# Patient Record
Sex: FEMALE | Race: WHITE | Employment: FULL TIME | URBAN - METROPOLITAN AREA
[De-identification: names, ages, dates, MRNs, and addresses within clinical notes are randomized per-mention and may not be internally consistent; named-entity substitution may affect disease eponyms.]

---

## 2023-02-13 PROBLEM — R00.2 PALPITATIONS: Status: ACTIVE | Noted: 2023-02-13

## 2023-02-13 PROBLEM — R00.0 TACHYCARDIA: Status: ACTIVE | Noted: 2023-02-13

## 2023-02-13 RX ORDER — NORETHINDRONE ACETATE AND ETHINYL ESTRADIOL 1MG-20(21)
1 KIT ORAL DAILY
COMMUNITY
Start: 2021-03-01

## 2023-02-13 RX ORDER — FLUTICASONE PROPIONATE 50 MCG
1 SPRAY, SUSPENSION (ML) NASAL DAILY
COMMUNITY
Start: 2018-11-16 | End: 2023-05-25 | Stop reason: SDUPTHER

## 2023-03-24 ENCOUNTER — APPOINTMENT (OUTPATIENT)
Dept: PRIMARY CARE | Facility: CLINIC | Age: 23
End: 2023-03-24
Payer: COMMERCIAL

## 2023-05-25 ENCOUNTER — OFFICE VISIT (OUTPATIENT)
Dept: PRIMARY CARE | Facility: CLINIC | Age: 23
End: 2023-05-25
Payer: COMMERCIAL

## 2023-05-25 VITALS
BODY MASS INDEX: 19.87 KG/M2 | OXYGEN SATURATION: 97 % | DIASTOLIC BLOOD PRESSURE: 65 MMHG | SYSTOLIC BLOOD PRESSURE: 125 MMHG | WEIGHT: 116.4 LBS | TEMPERATURE: 97.5 F | HEIGHT: 64 IN | HEART RATE: 100 BPM

## 2023-05-25 DIAGNOSIS — Z00.00 ROUTINE HEALTH MAINTENANCE: ICD-10-CM

## 2023-05-25 DIAGNOSIS — Z13.89 SCREENING FOR MULTIPLE CONDITIONS: ICD-10-CM

## 2023-05-25 DIAGNOSIS — R00.0 TACHYCARDIA: Primary | ICD-10-CM

## 2023-05-25 PROCEDURE — G0444 DEPRESSION SCREEN ANNUAL: HCPCS | Performed by: STUDENT IN AN ORGANIZED HEALTH CARE EDUCATION/TRAINING PROGRAM

## 2023-05-25 PROCEDURE — 1036F TOBACCO NON-USER: CPT | Performed by: STUDENT IN AN ORGANIZED HEALTH CARE EDUCATION/TRAINING PROGRAM

## 2023-05-25 PROCEDURE — 99395 PREV VISIT EST AGE 18-39: CPT | Performed by: STUDENT IN AN ORGANIZED HEALTH CARE EDUCATION/TRAINING PROGRAM

## 2023-05-25 PROCEDURE — G0442 ANNUAL ALCOHOL SCREEN 15 MIN: HCPCS | Performed by: STUDENT IN AN ORGANIZED HEALTH CARE EDUCATION/TRAINING PROGRAM

## 2023-05-25 PROCEDURE — 93000 ELECTROCARDIOGRAM COMPLETE: CPT | Performed by: STUDENT IN AN ORGANIZED HEALTH CARE EDUCATION/TRAINING PROGRAM

## 2023-05-25 ASSESSMENT — ENCOUNTER SYMPTOMS
CHILLS: 0
FEVER: 0
NAUSEA: 0
NERVOUS/ANXIOUS: 0
DYSURIA: 0
DIZZINESS: 0
VOMITING: 0
PALPITATIONS: 0
SHORTNESS OF BREATH: 0
DIAPHORESIS: 0
LIGHT-HEADEDNESS: 0

## 2023-05-25 ASSESSMENT — PATIENT HEALTH QUESTIONNAIRE - PHQ9
SUM OF ALL RESPONSES TO PHQ9 QUESTIONS 1 AND 2: 0
2. FEELING DOWN, DEPRESSED OR HOPELESS: NOT AT ALL
1. LITTLE INTEREST OR PLEASURE IN DOING THINGS: NOT AT ALL

## 2023-05-25 NOTE — ASSESSMENT & PLAN NOTE
On OCP- sees gyn     Allergy to bananas     ER Nurse  UTD HPV and tdap    CPE completed.  Advised to keep a heart healthy, low fat  diet with fruits and veggies like Mediterranean diet.  Advised on the importance of exercise and maintaining 150 minutes of exercise per week (30 minutes per day 5 days a week).  Advised on regular eye and dental visits.  Discussed age appropriate cancer screening, immunizations and recommendations given.  Discussed avoiding illicit drugs and tobacco. Advised on appropriate use of alcohol.  Advised to wear seat belt.  F/u 1 year Dr. Montiel or malik BAHENAN  Advised covid vaccination and flu vaccination after labor day.  Ordered routine labs

## 2023-05-25 NOTE — ASSESSMENT & PLAN NOTE
Initial  on arrival, improved to 100. EKG due to irregular rhythm on PE and tachycardia, showed sinus arrythmia, present on prior EKGs.  S/p echo 2 years ago and saw cardiology initially. Advised F/u with cardiology for this as never saw for follow up.

## 2023-05-25 NOTE — PROGRESS NOTES
"Subjective   Patient ID: Eladia Victoria is a 22 y.o. female who presents for Annual Exam.  She is here for CPE. No acute concerns today. Worked until 3:30AM last night and rushed to come to appt today. HR at home usually in 50-80s.     PAP UTD, seeing gyn next month.     Social History: Lives at home with parents. Currently ER nurse. In a relationship and feels safe in her relationship.        Review of Systems   Constitutional:  Negative for chills, diaphoresis and fever.   HENT:  Negative for hearing loss.    Eyes:  Negative for visual disturbance.   Respiratory:  Negative for shortness of breath.    Cardiovascular:  Negative for chest pain, palpitations and leg swelling.   Gastrointestinal:  Negative for nausea and vomiting.   Endocrine: Negative for cold intolerance and heat intolerance.   Genitourinary:  Negative for dysuria.   Skin:  Negative for rash.   Neurological:  Negative for dizziness and light-headedness.   Psychiatric/Behavioral:  The patient is not nervous/anxious.        /65   Pulse 100   Temp 36.4 °C (97.5 °F)   Ht 1.619 m (5' 3.75\")   Wt 52.8 kg (116 lb 6.4 oz)   SpO2 97%   BMI 20.14 kg/m²     Objective   Physical Exam  Vitals reviewed.   HENT:      Head: Normocephalic.      Right Ear: Tympanic membrane, ear canal and external ear normal. There is no impacted cerumen.      Left Ear: Tympanic membrane, ear canal and external ear normal. There is no impacted cerumen.      Mouth/Throat:      Mouth: Mucous membranes are moist.      Pharynx: Oropharynx is clear. No oropharyngeal exudate or posterior oropharyngeal erythema.   Cardiovascular:      Rate and Rhythm: Normal rate. Rhythm irregular.   Pulmonary:      Effort: Pulmonary effort is normal. No respiratory distress.      Breath sounds: Normal breath sounds.   Abdominal:      General: Bowel sounds are normal. There is no distension.      Palpations: Abdomen is soft.      Tenderness: There is no abdominal tenderness. There is no guarding " or rebound.   Musculoskeletal:         General: No swelling or deformity.      Cervical back: Neck supple. No tenderness.   Lymphadenopathy:      Cervical: No cervical adenopathy.   Skin:     Coloration: Skin is not jaundiced.   Neurological:      Mental Status: She is alert.   Psychiatric:         Mood and Affect: Mood normal.         Behavior: Behavior normal.         Assessment/Plan   Problem List Items Addressed This Visit          Circulatory    Tachycardia - Primary     Initial  on arrival, improved to 100. EKG due to irregular rhythm on PE and tachycardia, showed sinus arrythmia, present on prior EKGs.  S/p echo 2 years ago and saw cardiology initially. Advised F/u with cardiology for this as never saw for follow up.         Relevant Orders    ECG 12 lead (Clinic Performed) (Completed)       Other    Routine health maintenance     On OCP- sees gyn     Allergy to bananas     ER Nurse  UTD HPV and tdap    CPE completed.  Advised to keep a heart healthy, low fat  diet with fruits and veggies like Mediterranean diet.  Advised on the importance of exercise and maintaining 150 minutes of exercise per week (30 minutes per day 5 days a week).  Advised on regular eye and dental visits.  Discussed age appropriate cancer screening, immunizations and recommendations given.  Discussed avoiding illicit drugs and tobacco. Advised on appropriate use of alcohol.  Advised to wear seat belt.  F/u 1 year Dr. Montiel or malik PRN  Advised covid vaccination and flu vaccination after labor day.  Ordered routine labs         Relevant Orders    CBC    Lipid Panel    Comprehensive Metabolic Panel    TSH with reflex to Free T4 if abnormal     Other Visit Diagnoses       Screening for multiple conditions [Z13.89 (ICD-10-CM)]

## 2024-05-23 ENCOUNTER — OFFICE VISIT (OUTPATIENT)
Dept: PRIMARY CARE | Facility: CLINIC | Age: 24
End: 2024-05-23
Payer: COMMERCIAL

## 2024-05-23 VITALS
DIASTOLIC BLOOD PRESSURE: 84 MMHG | TEMPERATURE: 99 F | WEIGHT: 119.2 LBS | HEIGHT: 64 IN | OXYGEN SATURATION: 98 % | BODY MASS INDEX: 20.35 KG/M2 | SYSTOLIC BLOOD PRESSURE: 117 MMHG | HEART RATE: 102 BPM | RESPIRATION RATE: 16 BRPM

## 2024-05-23 DIAGNOSIS — Z00.00 ROUTINE HEALTH MAINTENANCE: Primary | ICD-10-CM

## 2024-05-23 DIAGNOSIS — L70.9 ACNE, UNSPECIFIED ACNE TYPE: ICD-10-CM

## 2024-05-23 DIAGNOSIS — Z91.018 ALLERGY TO BANANA: ICD-10-CM

## 2024-05-23 PROCEDURE — 1036F TOBACCO NON-USER: CPT | Performed by: INTERNAL MEDICINE

## 2024-05-23 PROCEDURE — 99395 PREV VISIT EST AGE 18-39: CPT | Performed by: INTERNAL MEDICINE

## 2024-05-23 RX ORDER — CLINDAMYCIN PHOSPHATE 10 UG/ML
LOTION TOPICAL 2 TIMES DAILY
Qty: 60 ML | Refills: 0 | Status: SHIPPED | OUTPATIENT
Start: 2024-05-23 | End: 2025-05-23

## 2024-05-23 RX ORDER — EPINEPHRINE 0.3 MG/.3ML
1 INJECTION SUBCUTANEOUS ONCE AS NEEDED
Qty: 2 EACH | Refills: 0 | Status: SHIPPED | OUTPATIENT
Start: 2024-05-23

## 2024-05-23 ASSESSMENT — ENCOUNTER SYMPTOMS
BLOOD IN STOOL: 0
POLYDIPSIA: 0
ABDOMINAL PAIN: 0
CHEST TIGHTNESS: 0
DYSURIA: 0
HEMATURIA: 0
MYALGIAS: 0
WHEEZING: 0
DYSPHORIC MOOD: 0
CHILLS: 0
VOMITING: 0
PALPITATIONS: 0
NERVOUS/ANXIOUS: 0
COUGH: 0
UNEXPECTED WEIGHT CHANGE: 0
FREQUENCY: 0
WOUND: 0
NAUSEA: 0
SORE THROAT: 0
EYE PAIN: 0
DIARRHEA: 0
FEVER: 0
ARTHRALGIAS: 0
DIZZINESS: 0
CONSTIPATION: 0
HEADACHES: 0
RHINORRHEA: 0
POLYPHAGIA: 0
SHORTNESS OF BREATH: 0

## 2024-05-23 ASSESSMENT — PATIENT HEALTH QUESTIONNAIRE - PHQ9
SUM OF ALL RESPONSES TO PHQ9 QUESTIONS 1 AND 2: 0
1. LITTLE INTEREST OR PLEASURE IN DOING THINGS: NOT AT ALL
2. FEELING DOWN, DEPRESSED OR HOPELESS: NOT AT ALL

## 2024-05-23 NOTE — PROGRESS NOTES
"Subjective   Patient ID: Eladia Victoria is a 23 y.o. female who presents for Annual Exam.    HPI     HR in 60s    Dental visits- UTD  Eye visits-no issues    Exercise-Hikes, weights  Diet- healthy     Alcohol use-1-2 x per week  Smoking-none    No concerns    Acne under chin. Uses benzoyl peroxide 10% otc and otc tretinoin and helps minimally. Tretinoin was too drying    Review of Systems   Constitutional:  Negative for chills, fever and unexpected weight change.   HENT:  Negative for congestion, hearing loss, rhinorrhea and sore throat.    Eyes:  Negative for pain and visual disturbance.   Respiratory:  Negative for cough, chest tightness, shortness of breath and wheezing.    Cardiovascular:  Negative for chest pain and palpitations.   Gastrointestinal:  Negative for abdominal pain, blood in stool, constipation, diarrhea, nausea and vomiting.   Endocrine: Negative for cold intolerance, heat intolerance, polydipsia and polyphagia.   Genitourinary:  Negative for dysuria, frequency and hematuria.   Musculoskeletal:  Negative for arthralgias and myalgias.   Skin:  Negative for rash and wound.   Neurological:  Negative for dizziness, syncope and headaches.   Psychiatric/Behavioral:  Negative for dysphoric mood. The patient is not nervous/anxious.        Objective   /84 (BP Location: Left arm, Patient Position: Sitting, BP Cuff Size: Adult)   Pulse 102   Temp 37.2 °C (99 °F)   Resp 16   Ht 1.613 m (5' 3.5\")   Wt 54.1 kg (119 lb 3.2 oz)   SpO2 98%   BMI 20.78 kg/m²     Physical Exam  Vitals reviewed.   Constitutional:       Appearance: Normal appearance. She is not ill-appearing.   HENT:      Head: Normocephalic and atraumatic.      Right Ear: Tympanic membrane normal.      Left Ear: Tympanic membrane normal.      Nose: Nose normal.      Mouth/Throat:      Mouth: Mucous membranes are moist.      Pharynx: Oropharynx is clear.   Eyes:      Extraocular Movements: Extraocular movements intact.      " Conjunctiva/sclera: Conjunctivae normal.      Pupils: Pupils are equal, round, and reactive to light.   Cardiovascular:      Rate and Rhythm: Normal rate and regular rhythm.   Pulmonary:      Effort: Pulmonary effort is normal.      Breath sounds: Normal breath sounds. No wheezing.   Chest:   Breasts:     Right: Normal. No mass.      Left: Normal. No mass.   Abdominal:      General: There is no distension.      Palpations: Abdomen is soft. There is no mass.      Tenderness: There is no abdominal tenderness.   Musculoskeletal:         General: No swelling.      Cervical back: Neck supple.   Lymphadenopathy:      Cervical: No cervical adenopathy.   Neurological:      General: No focal deficit present.      Mental Status: She is alert and oriented to person, place, and time.      Gait: Gait normal.   Psychiatric:         Mood and Affect: Mood normal.         Behavior: Behavior normal.         Thought Content: Thought content normal.         Assessment/Plan   Problem List Items Addressed This Visit             ICD-10-CM    Routine health maintenance - Primary Z00.00    Relevant Orders    Lipid Panel    Glucose, fasting    Allergy to banana Z91.018    Relevant Medications    EPINEPHrine (Epipen) 0.3 mg/0.3 mL injection syringe     Other Visit Diagnoses         Codes    Acne, unspecified acne type     L70.9    Relevant Medications    clindamycin (Cleocin T) 1 % lotion          CPE completed.  Advised to keep a heart healthy, low fat  diet with fruits and veggies like Mediterranean diet.  Advised on the importance of exercise and maintaining 150 minutes of exercise per week (30 minutes per day 5 days a week).  Advised on regular eye and dental visits.  Discussed age appropriate cancer screening, immunizations and recommendations given.  Discussed avoiding illicit drugs and tobacco. Advised on appropriate use of alcohol.  Advised to wear seat belt.    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

## 2024-07-03 ENCOUNTER — LAB (OUTPATIENT)
Dept: LAB | Facility: LAB | Age: 24
End: 2024-07-03
Payer: COMMERCIAL

## 2024-07-03 ENCOUNTER — APPOINTMENT (OUTPATIENT)
Dept: OBSTETRICS AND GYNECOLOGY | Facility: CLINIC | Age: 24
End: 2024-07-03
Payer: COMMERCIAL

## 2024-07-03 VITALS
HEIGHT: 64 IN | DIASTOLIC BLOOD PRESSURE: 70 MMHG | BODY MASS INDEX: 20.14 KG/M2 | SYSTOLIC BLOOD PRESSURE: 110 MMHG | WEIGHT: 118 LBS

## 2024-07-03 DIAGNOSIS — Z12.4 PAP SMEAR FOR CERVICAL CANCER SCREENING: ICD-10-CM

## 2024-07-03 DIAGNOSIS — Z00.00 ROUTINE HEALTH MAINTENANCE: ICD-10-CM

## 2024-07-03 DIAGNOSIS — Z11.3 ROUTINE SCREENING FOR STI (SEXUALLY TRANSMITTED INFECTION): ICD-10-CM

## 2024-07-03 DIAGNOSIS — Z30.41 ENCOUNTER FOR SURVEILLANCE OF CONTRACEPTIVE PILLS: ICD-10-CM

## 2024-07-03 DIAGNOSIS — Z01.419 WELL WOMAN EXAM: Primary | ICD-10-CM

## 2024-07-03 LAB
CHOLEST SERPL-MCNC: 215 MG/DL (ref 0–199)
CHOLESTEROL/HDL RATIO: 2.4
GLUCOSE P FAST SERPL-MCNC: 81 MG/DL (ref 74–99)
HDLC SERPL-MCNC: 89.6 MG/DL
LDLC SERPL CALC-MCNC: 109 MG/DL
NON HDL CHOLESTEROL: 125 MG/DL (ref 0–149)
TRIGL SERPL-MCNC: 81 MG/DL (ref 0–149)
VLDL: 16 MG/DL (ref 0–40)

## 2024-07-03 PROCEDURE — 1036F TOBACCO NON-USER: CPT | Performed by: OBSTETRICS & GYNECOLOGY

## 2024-07-03 PROCEDURE — 36415 COLL VENOUS BLD VENIPUNCTURE: CPT

## 2024-07-03 PROCEDURE — 99395 PREV VISIT EST AGE 18-39: CPT | Performed by: OBSTETRICS & GYNECOLOGY

## 2024-07-03 PROCEDURE — 87661 TRICHOMONAS VAGINALIS AMPLIF: CPT

## 2024-07-03 PROCEDURE — 87491 CHLMYD TRACH DNA AMP PROBE: CPT

## 2024-07-03 PROCEDURE — 80061 LIPID PANEL: CPT

## 2024-07-03 PROCEDURE — 87591 N.GONORRHOEAE DNA AMP PROB: CPT

## 2024-07-03 PROCEDURE — 82947 ASSAY GLUCOSE BLOOD QUANT: CPT

## 2024-07-03 RX ORDER — NORETHINDRONE ACETATE AND ETHINYL ESTRADIOL 1MG-20(21)
1 KIT ORAL DAILY
Qty: 90 TABLET | Refills: 3 | Status: SHIPPED | OUTPATIENT
Start: 2024-07-03 | End: 2024-07-03 | Stop reason: WASHOUT

## 2024-07-03 RX ORDER — DROSPIRENONE AND ETHINYL ESTRADIOL 0.02-3(28)
1 KIT ORAL DAILY
Qty: 84 TABLET | Refills: 3 | Status: SHIPPED | OUTPATIENT
Start: 2024-07-03 | End: 2025-07-03

## 2024-07-03 NOTE — PROGRESS NOTES
"Eladia Victoria is a 23 y.o. here for well woman visit.  HPI: Bothered by acne, new at age 23. Also period lasts for 2 weeks.  Working straight nights.   No partner now.    GynHx: heavy periods before the pill  Pap hx- first one neg  Social History     Substance and Sexual Activity   Sexual Activity Yes   Current contraception: CHC  STIs: none  Exercise:  yes  Objective   /70   Ht 1.613 m (5' 3.5\")   Wt 53.5 kg (118 lb)   LMP 06/19/2024   BMI 20.57 kg/m²   Physical Exam  Vitals reviewed.   Constitutional:       Appearance: Normal appearance. She is normal weight.   HENT:      Head: Normocephalic.   Neck:      Thyroid: No thyroid mass or thyromegaly.   Pulmonary:      Effort: Pulmonary effort is normal.   Chest:   Breasts:     Right: Normal.      Left: Normal.   Abdominal:      Palpations: Abdomen is soft.   Genitourinary:     General: Normal vulva.      Exam position: Lithotomy position.      Vagina: Normal.      Cervix: Normal.      Uterus: Normal.       Adnexa: Right adnexa normal and left adnexa normal.   Musculoskeletal:         General: Normal range of motion.      Cervical back: Normal range of motion and neck supple.   Lymphadenopathy:      Upper Body:      Right upper body: No supraclavicular or axillary adenopathy.      Left upper body: No supraclavicular or axillary adenopathy.   Skin:     General: Skin is warm and dry.   Neurological:      General: No focal deficit present.      Mental Status: She is alert.   Psychiatric:         Mood and Affect: Mood normal.         Behavior: Behavior normal.         Thought Content: Thought content normal.         Judgment: Judgment normal.          Assessment and Plan:  Problem List Items Addressed This Visit          Genitourinary and Reproductive    Well woman exam - Primary    Overview     Thank you for your visit for well woman care.  At your visit, you had a pap smear performed. The results will be available in [facility] in two to three weeks.  You requested " sti testing. The results will be available in [facility] in 1-2 days.   You would like to try a different pill to see if it will improve your skin. Rx sent.          Other Visit Diagnoses       Encounter for surveillance of contraceptive pills        Relevant Medications    drospirenone-ethinyl estradioL ([place], 28,) 3-0.02 mg tablet    Pap smear for cervical cancer screening        Relevant Orders    THINPREP PAP TEST (21-24)    Routine screening for STI (sexually transmitted infection)        Relevant Orders    THINPREP PAP TEST (21-24)           No orders of the defined types were placed in this encounter.

## 2024-07-05 PROBLEM — Z01.419 WELL WOMAN EXAM: Status: ACTIVE | Noted: 2023-05-25

## 2024-07-09 LAB
C TRACH RRNA SPEC QL NAA+PROBE: NEGATIVE
N GONORRHOEA DNA SPEC QL PROBE+SIG AMP: NEGATIVE
T VAGINALIS RRNA SPEC QL NAA+PROBE: NEGATIVE

## 2024-07-10 LAB
CYTOLOGY CMNT CVX/VAG CYTO-IMP: NORMAL
LAB AP HPV HR: NORMAL
LAB AP PAP ADDITIONAL TESTS: NORMAL
LABORATORY COMMENT REPORT: NORMAL
LMP START DATE: NORMAL
PATH REPORT.TOTAL CANCER: NORMAL
RESIDENT REVIEW: NORMAL

## 2024-07-12 ENCOUNTER — TELEPHONE (OUTPATIENT)
Dept: OBSTETRICS AND GYNECOLOGY | Facility: CLINIC | Age: 24
End: 2024-07-12
Payer: COMMERCIAL

## 2024-07-12 ENCOUNTER — TELEPHONE (OUTPATIENT)
Dept: OBSTETRICS AND GYNECOLOGY | Facility: CLINIC | Age: 24
End: 2024-07-12

## 2024-07-12 NOTE — TELEPHONE ENCOUNTER
Pt returned call to office.    Pt aware of need for procedure.   Abnl pap explained.   Understanding voiced.   Pt transferred to  to schedule.

## 2024-07-12 NOTE — TELEPHONE ENCOUNTER
----- Message from Rosalind Mendes sent at 7/11/2024 11:22 AM EDT -----  Regarding: Call the patient and let her know the results, please.  Call the patient and let her know the results, please.  ----- Message -----  From: Lab, Background User  Sent: 7/9/2024  10:40 AM EDT  To: Rosalind Mendes MD

## 2024-07-12 NOTE — TELEPHONE ENCOUNTER
Attempted to call pt to discuss pap and plan of care.   Got her voice mail.   Left message for pt to call office.    RE: needs colpo per dr glynn for abnormal pap findings (LSIL,can not r/o ASC-H)

## 2024-08-26 ENCOUNTER — APPOINTMENT (OUTPATIENT)
Dept: OBSTETRICS AND GYNECOLOGY | Facility: CLINIC | Age: 24
End: 2024-08-26
Payer: COMMERCIAL

## 2024-08-26 DIAGNOSIS — N89.8 VAGINAL IRRITATION: ICD-10-CM

## 2024-08-26 PROCEDURE — 87205 SMEAR GRAM STAIN: CPT

## 2024-08-27 LAB
BACTERIAL VAGINOSIS VAG-IMP: NORMAL
CLUE CELLS VAG LPF-#/AREA: NORMAL /[LPF]
NUGENT SCORE: 6
YEAST VAG WET PREP-#/AREA: NORMAL

## 2024-08-28 DIAGNOSIS — N76.0 BACTERIAL VAGINOSIS: ICD-10-CM

## 2024-08-28 DIAGNOSIS — B96.89 BACTERIAL VAGINOSIS: ICD-10-CM

## 2024-08-28 RX ORDER — METRONIDAZOLE 500 MG/1
500 TABLET ORAL 2 TIMES DAILY
Qty: 14 TABLET | Refills: 0 | Status: SHIPPED | OUTPATIENT
Start: 2024-08-28 | End: 2024-09-04

## 2024-08-29 ENCOUNTER — APPOINTMENT (OUTPATIENT)
Dept: OBSTETRICS AND GYNECOLOGY | Facility: CLINIC | Age: 24
End: 2024-08-29
Payer: COMMERCIAL

## 2024-08-29 VITALS — BODY MASS INDEX: 20.32 KG/M2 | HEIGHT: 64 IN | WEIGHT: 119 LBS

## 2024-08-29 DIAGNOSIS — R87.611 PAP SMEAR OF CERVIX WITH ASCUS, CANNOT EXCLUDE HGSIL: ICD-10-CM

## 2024-08-29 DIAGNOSIS — R87.612 LGSIL OF CERVIX OF UNDETERMINED SIGNIFICANCE: ICD-10-CM

## 2024-08-29 PROCEDURE — 57454 BX/CURETT OF CERVIX W/SCOPE: CPT | Performed by: OBSTETRICS & GYNECOLOGY

## 2024-08-29 ASSESSMENT — PAIN SCALES - GENERAL: PAINLEVEL: 0-NO PAIN

## 2024-08-29 NOTE — PROGRESS NOTES
colpoPatient ID: Eladia Victoria is a 24 y.o. female.    Colposcopy    Date/Time: 8/29/2024 2:16 PM    Performed by: Rosalind Mendes MD  Authorized by: Rosalind Mendes MD    Procedure location: cervix    Consent:     Patient questions answered: yes      Risks and benefits of the procedure and its alternatives discussed: yes      Procedural risks discussed:  Bleeding and repeat procedure    Consent obtained:  Written    Consent given by:  Patient  Universal Protocol:     A time out verifies correct patient, procedure, equipment, support staff, and site/side marked as required: yes      Patient states understanding of procedure being performed: yes      Relevant documents present and verified: yes      Test results available and properly labeled: yes      Imaging studies available: no      Required blood products, implants, devices, and special equipment available: yes      Side/site marked: yes    Indication:     Cervical indication(s): ASC-H and TEENA 1    Pre-procedure:     Prep solution(s): acetic acid and Lugol's iodine    Procedure:     Colposcopy with: cervical biopsy and endocervical curettage      Biopsy taken: yes      # of biopsies:  2    Other site: 6, 3.    Cervix visibility: fully visualized      SCJ visibility: fully visualized      Lesion visualized: fully visualized      Acetowhite lesion(s): cervix      Acetowhite lesion(s) description:  See note    Cervical impression: low grade      Ferric subsulfate solution applied: yes      Tampon inserted: no    Post-procedure:     Patient tolerance of procedure:  Patient tolerated the procedure well with no immediate complications    Aceto white changes, non staining with lugols

## 2024-09-09 LAB
LAB AP ASR DISCLAIMER: NORMAL
LABORATORY COMMENT REPORT: NORMAL
PATH REPORT.COMMENTS IMP SPEC: NORMAL
PATH REPORT.FINAL DX SPEC: NORMAL
PATH REPORT.GROSS SPEC: NORMAL
PATH REPORT.RELEVANT HX SPEC: NORMAL
PATH REPORT.TOTAL CANCER: NORMAL

## 2025-05-23 ENCOUNTER — APPOINTMENT (OUTPATIENT)
Dept: PRIMARY CARE | Facility: CLINIC | Age: 25
End: 2025-05-23
Payer: COMMERCIAL

## 2025-06-20 DIAGNOSIS — Z30.41 ENCOUNTER FOR SURVEILLANCE OF CONTRACEPTIVE PILLS: ICD-10-CM

## 2025-06-20 RX ORDER — DROSPIRENONE AND ETHINYL ESTRADIOL 0.02-3(28)
1 KIT ORAL DAILY
Qty: 84 TABLET | Refills: 0 | Status: SHIPPED | OUTPATIENT
Start: 2025-06-20

## 2025-07-09 ENCOUNTER — APPOINTMENT (OUTPATIENT)
Dept: OBSTETRICS AND GYNECOLOGY | Facility: CLINIC | Age: 25
End: 2025-07-09
Payer: COMMERCIAL

## 2025-07-10 ENCOUNTER — APPOINTMENT (OUTPATIENT)
Dept: OBSTETRICS AND GYNECOLOGY | Facility: CLINIC | Age: 25
End: 2025-07-10
Payer: COMMERCIAL

## 2025-07-16 ENCOUNTER — APPOINTMENT (OUTPATIENT)
Dept: OBSTETRICS AND GYNECOLOGY | Facility: CLINIC | Age: 25
End: 2025-07-16
Payer: COMMERCIAL

## 2025-08-07 ENCOUNTER — HOSPITAL ENCOUNTER (OUTPATIENT)
Dept: RADIOLOGY | Facility: HOSPITAL | Age: 25
Discharge: HOME | End: 2025-08-07
Payer: COMMERCIAL

## 2025-08-07 ENCOUNTER — OFFICE VISIT (OUTPATIENT)
Dept: OBSTETRICS AND GYNECOLOGY | Facility: CLINIC | Age: 25
End: 2025-08-07
Payer: COMMERCIAL

## 2025-08-07 VITALS
SYSTOLIC BLOOD PRESSURE: 118 MMHG | HEIGHT: 64 IN | DIASTOLIC BLOOD PRESSURE: 64 MMHG | WEIGHT: 123 LBS | BODY MASS INDEX: 21 KG/M2

## 2025-08-07 DIAGNOSIS — N92.1 BREAKTHROUGH BLEEDING ON BIRTH CONTROL PILLS: ICD-10-CM

## 2025-08-07 DIAGNOSIS — N92.1 BREAKTHROUGH BLEEDING ON BIRTH CONTROL PILLS: Primary | ICD-10-CM

## 2025-08-07 DIAGNOSIS — Z11.3 ROUTINE SCREENING FOR STI (SEXUALLY TRANSMITTED INFECTION): ICD-10-CM

## 2025-08-07 PROCEDURE — 3008F BODY MASS INDEX DOCD: CPT | Performed by: ADVANCED PRACTICE MIDWIFE

## 2025-08-07 PROCEDURE — 99213 OFFICE O/P EST LOW 20 MIN: CPT | Performed by: ADVANCED PRACTICE MIDWIFE

## 2025-08-07 PROCEDURE — 76856 US EXAM PELVIC COMPLETE: CPT

## 2025-08-07 ASSESSMENT — PAIN SCALES - GENERAL: PAINLEVEL_OUTOF10: 0-NO PAIN

## 2025-08-07 NOTE — PROGRESS NOTES
Subjective   Patient ID: Eladia Victoria is a 25 y.o. female who presents for Vaginal Bleeding (AUB on OCP///Chaperone Declined: COLUMBA Estrada/).  HPI    Pt. Here to discuss AUB on OCP  Started light on Sunday and progressed to heavier  Soaked through super tampons, still bleeding today    Denies dizziness or lightheadedness  Took a UPT and it was negative    Had cramping pain also    On OCPs x 12 years     Switched OCPs last year due to AUB also, was having 2 weeks periods.  Reports since then things have improved until now     New male partner for a few months  No condoms    Reports bleeding is still heavy today    Wondering about ovarian cysts, reports she has had post coital bleeding before too    Due for repeat co-testing but declines today due to period, APE scheduled in November       Review of Systems    Objective   Physical Exam  Constitutional:       Appearance: Normal appearance. She is normal weight.     Neurological:      Mental Status: She is alert.     Psychiatric:         Mood and Affect: Mood normal.         Behavior: Behavior normal.         Thought Content: Thought content normal.         Judgment: Judgment normal.         Assessment/Plan   Problem List Items Addressed This Visit           ICD-10-CM       Medium    Breakthrough bleeding on birth control pills - Primary N92.1    Discussed switching from a 20mcg pill to a higher level-not currently interested.   Discussed stopping active pills and restarting in a few days, can also do OCP taper if bleeding does not improve           Relevant Orders    US PELVIS TRANSABDOMINAL WITH TRANSVAGINAL    Routine screening for STI (sexually transmitted infection) Z11.3    Relevant Orders    C. trachomatis / N. gonorrhoeae, Amplified, Urogenital    Trichomonas vaginalis, Amplified            MARIO Lee 08/07/25 8:41 AM

## 2025-08-07 NOTE — ASSESSMENT & PLAN NOTE
Discussed switching from a 20mcg pill to a higher level-not currently interested.   Discussed stopping active pills and restarting in a few days, can also do OCP taper if bleeding does not improve

## 2025-08-08 LAB
C TRACH RRNA SPEC QL NAA+PROBE: NOT DETECTED
N GONORRHOEA RRNA SPEC QL NAA+PROBE: NOT DETECTED
QUEST GC CT AMPLIFIED (ALWAYS MESSAGE): NORMAL
T VAGINALIS RRNA SPEC QL NAA+PROBE: NOT DETECTED

## 2025-08-14 DIAGNOSIS — N92.1 BREAKTHROUGH BLEEDING ON BIRTH CONTROL PILLS: Primary | ICD-10-CM

## 2025-08-14 RX ORDER — DESOGESTREL AND ETHINYL ESTRADIOL 0.15-0.03
1 KIT ORAL DAILY
Qty: 28 TABLET | Refills: 12 | Status: SHIPPED | OUTPATIENT
Start: 2025-08-14 | End: 2026-08-14

## 2025-09-22 ENCOUNTER — APPOINTMENT (OUTPATIENT)
Dept: PRIMARY CARE | Facility: CLINIC | Age: 25
End: 2025-09-22
Payer: COMMERCIAL

## 2025-11-05 ENCOUNTER — APPOINTMENT (OUTPATIENT)
Dept: OBSTETRICS AND GYNECOLOGY | Facility: CLINIC | Age: 25
End: 2025-11-05
Payer: COMMERCIAL